# Patient Record
Sex: MALE | Race: AMERICAN INDIAN OR ALASKA NATIVE
[De-identification: names, ages, dates, MRNs, and addresses within clinical notes are randomized per-mention and may not be internally consistent; named-entity substitution may affect disease eponyms.]

---

## 2020-10-18 ENCOUNTER — HOSPITAL ENCOUNTER (EMERGENCY)
Dept: HOSPITAL 5 - ED | Age: 35
Discharge: HOME | End: 2020-10-18
Payer: SELF-PAY

## 2020-10-18 VITALS — SYSTOLIC BLOOD PRESSURE: 168 MMHG | DIASTOLIC BLOOD PRESSURE: 117 MMHG

## 2020-10-18 DIAGNOSIS — I10: ICD-10-CM

## 2020-10-18 DIAGNOSIS — I16.0: Primary | ICD-10-CM

## 2020-10-18 DIAGNOSIS — Z79.899: ICD-10-CM

## 2020-10-18 DIAGNOSIS — F17.200: ICD-10-CM

## 2020-10-18 DIAGNOSIS — E86.0: ICD-10-CM

## 2020-10-18 LAB
BASOPHILS # (AUTO): 0.1 K/MM3 (ref 0–0.1)
BASOPHILS NFR BLD AUTO: 0.7 % (ref 0–1.8)
BUN SERPL-MCNC: 13 MG/DL (ref 9–20)
BUN/CREAT SERPL: 13 %
CALCIUM SERPL-MCNC: 9.5 MG/DL (ref 8.4–10.2)
EOSINOPHIL # BLD AUTO: 0 K/MM3 (ref 0–0.4)
EOSINOPHIL NFR BLD AUTO: 0.3 % (ref 0–4.3)
HCT VFR BLD CALC: 43.9 % (ref 35.5–45.6)
HEMOLYSIS INDEX: 6
HGB BLD-MCNC: 14.9 GM/DL (ref 11.8–15.2)
LYMPHOCYTES # BLD AUTO: 1.7 K/MM3 (ref 1.2–5.4)
LYMPHOCYTES NFR BLD AUTO: 17 % (ref 13.4–35)
MCHC RBC AUTO-ENTMCNC: 34 % (ref 32–34)
MCV RBC AUTO: 91 FL (ref 84–94)
MONOCYTES # (AUTO): 0.7 K/MM3 (ref 0–0.8)
MONOCYTES % (AUTO): 6.8 % (ref 0–7.3)
PLATELET # BLD: 184 K/MM3 (ref 140–440)
RBC # BLD AUTO: 4.84 M/MM3 (ref 3.65–5.03)

## 2020-10-18 PROCEDURE — 70450 CT HEAD/BRAIN W/O DYE: CPT

## 2020-10-18 PROCEDURE — 99285 EMERGENCY DEPT VISIT HI MDM: CPT

## 2020-10-18 PROCEDURE — 85025 COMPLETE CBC W/AUTO DIFF WBC: CPT

## 2020-10-18 PROCEDURE — 96374 THER/PROPH/DIAG INJ IV PUSH: CPT

## 2020-10-18 PROCEDURE — 84484 ASSAY OF TROPONIN QUANT: CPT

## 2020-10-18 PROCEDURE — 36415 COLL VENOUS BLD VENIPUNCTURE: CPT

## 2020-10-18 PROCEDURE — 96361 HYDRATE IV INFUSION ADD-ON: CPT

## 2020-10-18 PROCEDURE — 80048 BASIC METABOLIC PNL TOTAL CA: CPT

## 2020-10-18 PROCEDURE — 93005 ELECTROCARDIOGRAM TRACING: CPT

## 2020-10-18 PROCEDURE — 71046 X-RAY EXAM CHEST 2 VIEWS: CPT

## 2020-10-18 NOTE — EMERGENCY DEPARTMENT REPORT
ED Dizziness HPI





- General


Chief Complaint: High BP


Stated Complaint: HBP


Time Seen by Provider: 10/18/20 16:57


Source: patient


Mode of arrival: Ambulatory


Limitations: No Limitations





- History of Present Illness


Initial Comments: 





Chief complaint:"I was dizzy.  I am worried about my blood pressure."





HPI: This is a 35-year-old male with history of hypertension tobacco dependence 

and medication noncompliance who presents with lightheadedness vomiting for the 

past 2 days.  For 2 days he has noticed lightheadedness and generalized 

weakness.  Mild malaise and fatigue.  No known sick contacts.  He denies fever. 

On yesterday he developed vomiting.  No known history of diabetes.  He denies 

headache.  Denies blurred vision.  Denies chest pain.  Denies abdominal pain.


MD Complaint: lightheadedness


-: Gradual, days(s) (2)


Timing: gradual onset


Description: lightheadedness


History of Same: No


History of Trauma: No


Severity: moderate


Improves With: rest


Worsens With: movement, exertion


Associated Symptoms: malaise, other (Vomiting)





- Related Data


                                  Previous Rx's











 Medication  Instructions  Recorded  Last Taken  Type


 


HYDROcodone/APAP 5-325 [[place] 1 each PO Q6HR PRN #16 tablet 12/15/16 Unknown Rx





5/325]    


 


amLODIPine 5 mg PO DAILY #30 tab 10/18/20 Unknown Rx


 


hydroCHLOROthiazide [HCTZ] 25 mg PO QDAY #30 tablet 10/18/20 Unknown Rx











                                    Allergies











Allergy/AdvReac Type Severity Reaction Status Date / Time


 


No Known Allergies Allergy   Verified 10/18/20 13:59














ED Review of Systems


ROS: 


Stated complaint: HBP


Other details as noted in HPI





Comment: All other systems reviewed and negative


Constitutional: malaise.  denies: fever


Respiratory: denies: cough, shortness of breath


Cardiovascular: denies: chest pain


Gastrointestinal: nausea, vomiting.  denies: abdominal pain, diarrhea





ED Past Medical Hx





- Past Medical History


Previous Medical History?: Yes


Hx Hypertension: Yes (noncompliant)





- Surgical History


Past Surgical History?: No





- Social History


Smoking Status: Current Every Day Smoker


Substance Use Type: None





- Medications


Home Medications: 


                                Home Medications











 Medication  Instructions  Recorded  Confirmed  Last Taken  Type


 


HYDROcodone/APAP 5-325 [[place] 1 each PO Q6HR PRN #16 tablet 12/15/16  Unknown Rx





5/325]     


 


amLODIPine 5 mg PO DAILY #30 tab 10/18/20  Unknown Rx


 


hydroCHLOROthiazide [HCTZ] 25 mg PO QDAY #30 tablet 10/18/20  Unknown Rx














ED Physical Exam





- General


Limitations: No Limitations


General appearance: alert, in no apparent distress, other (Appears mildly ill, 

appears uncomfortable)





- Head


Head exam: Present: atraumatic, normocephalic





- Eye


Eye exam: Present: normal appearance





- ENT


ENT exam: Present: mucous membranes moist





- Neck


Neck exam: Present: normal inspection, full ROM





- Respiratory


Respiratory exam: Present: normal lung sounds bilaterally.  Absent: respiratory 

distress, wheezes, rales, rhonchi





- Cardiovascular


Cardiovascular Exam: Present: regular rate, normal rhythm, normal heart sounds. 

 Absent: systolic murmur, diastolic murmur, rubs, gallop





- GI/Abdominal


GI/Abdominal exam: Present: soft, normal bowel sounds.  Absent: distended, 

tenderness, guarding, rebound





- Rectal


Rectal exam: Present: deferred





- Extremities Exam


Extremities exam: Present: normal inspection





- Neurological Exam


Neurological exam: Present: alert, oriented X3





- Psychiatric


Psychiatric exam: Present: normal affect, normal mood





- Skin


Skin exam: Present: warm, dry, intact, normal color.  Absent: rash





ED Course


                                   Vital Signs











  10/18/20 10/18/20 10/18/20





  14:05 17:30 18:00


 


Temperature 98.3 F  


 


Pulse Rate 78 67 96 H


 


Respiratory 20 24 30 H





Rate   


 


Blood Pressure  189/125 185/132


 


Blood Pressure 203/149  





[Right]   


 


O2 Sat by Pulse 100 98 92





Oximetry   














  10/18/20 10/18/20





  18:31 19:00


 


Temperature  


 


Pulse Rate 70 77


 


Respiratory 23 33 H





Rate  


 


Blood Pressure 165/115 160/121


 


Blood Pressure  





[Right]  


 


O2 Sat by Pulse 93 99





Oximetry  














ED Medical Decision Making





- Lab Data


Result diagrams: 


                                 10/18/20 14:59





                                 10/18/20 14:59








                         Laboratory Results - last 24 hr











  10/18/20 10/18/20





  14:59 14:59


 


WBC  9.8 


 


RBC  4.84 


 


Hgb  14.9 


 


Hct  43.9 


 


MCV  91 


 


MCH  31 


 


MCHC  34 


 


RDW  14.5 


 


Plt Count  184 


 


Lymph % (Auto)  17.0 


 


Mono % (Auto)  6.8 


 


Eos % (Auto)  0.3 


 


Baso % (Auto)  0.7 


 


Lymph # (Auto)  1.7 


 


Mono # (Auto)  0.7 


 


Eos # (Auto)  0.0 


 


Baso # (Auto)  0.1 


 


Seg Neutrophils %  75.2 H 


 


Seg Neutrophils #  7.3 


 


Sodium   139


 


Potassium   4.1


 


Chloride   101.1


 


Carbon Dioxide   25


 


Anion Gap   17


 


BUN   13


 


Creatinine   1.0


 


Estimated GFR   > 60


 


BUN/Creatinine Ratio   13


 


Glucose   97


 


Calcium   9.5


 


Troponin T   < 0.010














- EKG Data


-: EKG Interpreted by Me


EKG shows normal: sinus rhythm, axis


Rate: normal





- EKG Data


Interpretation: nonspecific ST-T wave daja, LVH





10/18/20 17:21


EKG obtained 1412


Normal sinus rhythm rate 80 bpm normal axis prolonged QTC positive LVH. + 

Repolarization normality





- Radiology Data


Radiology results: report reviewed, image reviewed





CHEST 1 VIEW 





INDICATION / CLINICAL INFORMATION: 


Chest Pain. 





COMPARISON: 


None available. 





FINDINGS: 





SUPPORT DEVICES: None. 


HEART / MEDIASTINUM: No significant abnormality. 


LUNGS / PLEURA: No significant pulmonary or pleural abnormality. No 

pneumothorax. 





ADDITIONAL FINDINGS: No significant additional findings. 





IMPRESSION: 


No significant abnormality 











- Medical Decision Making





Mr. Huff presents with fatigue lightheadedness vomiting as well as severe 

hypertension.





1.  Lightheadedness fatigue malaise vomiting attributed to early viral syndrome 

dehydration.  No indication of CVA or hyperglycemia.  Patient felt much better 

after receiving IV fluid therapy and IV antiemetic





2.  Hypertensive urgency due to noncompliance: Patient has severe hypertension. 

 No evidence of endorgan damage.  Patient given extensive verbal and written 

education.  I prescribed amlodipine hydrochlorothiazide.  Blood pressure 

improved with p.o. labetalol provided in the emergency department.





3.  CT head ordered per triage provider.  CT head revealed chronic white matter 

changes.  No intracranial hemorrhage.  Also right maxillary sinus opacification 

which appears to be an incidental finding.  Patient is symptom patient does not 

have symptoms of acute sinusitis.











                               Vital Signs - 24 hr











  10/18/20 10/18/20 10/18/20





  14:05 17:30 18:00


 


Temperature 98.3 F  


 


Pulse Rate 78 67 96 H


 


Respiratory 20 24 30 H





Rate   


 


Blood Pressure  189/125 185/132


 


Blood Pressure 203/149  





[Right]   


 


O2 Sat by Pulse 100 98 92





Oximetry   














  10/18/20 10/18/20





  18:31 19:00


 


Temperature  


 


Pulse Rate 70 77


 


Respiratory 23 33 H





Rate  


 


Blood Pressure 165/115 160/121


 


Blood Pressure  





[Right]  


 


O2 Sat by Pulse 93 99





Oximetry  











Critical care attestation.: 


If time is entered above; I have spent that time in minutes in the direct care 

of this critically ill patient, excluding procedure time.








ED Disposition


Clinical Impression: 


 Hypertensive urgency, Dehydration





Disposition: DC-01 TO HOME OR SELFCARE


Is pt being admited?: No


Does the pt Need Aspirin: No


Condition: Stable


Instructions:  Hypertension (ED)


Prescriptions: 


amLODIPine 5 mg PO DAILY #30 tab


hydroCHLOROthiazide [HCTZ] 25 mg PO QDAY #30 tablet


Referrals: 


WINSOME CARRILLO MD [Staff Physician] - 3-5 Days

## 2020-10-18 NOTE — CAT SCAN REPORT
CT BRAIN: 10/18/2020



INDICATION / CLINICAL INFORMATION:

Persistent dizziness.



COMPARISON: 

None available.



FINDINGS:



BRAIN/INTRACRANIAL STRUCTURES: Unenhanced CT images of the brain demonstrate no evidence of acute int
racranial abnormality.



There are focal areas of chronic appearing hypoattenuation located in the region of the left thalamus
 and right centrum semiovale, as well as in the right central bessie. These findings may be associated 
with chronic small vessel ischemic change or demyelination.



There is no evidence of hemorrhage. There are no abnormal extra-axial fluid collections.









EXTRACRANIAL STRUCTURES: Incidental note is made of opacification of the right maxillary sinus. Paran
tori sinuses are otherwise clear.







IMPRESSION:

 No definite evidence of acute abnormality. Chronic appearing white matter and thalamic hypodensities
.









All CT scans at this location are performed using dose reduction to ALARA by means of automated expos
ure control.



Signer Name: Cm Stephen MD 

Signed: 10/18/2020 3:02 PM

Workstation Name: [id number]

## 2020-10-18 NOTE — XRAY REPORT
CHEST 1 VIEW 



INDICATION / CLINICAL INFORMATION:

Chest Pain.



COMPARISON: 

None available.



FINDINGS:



SUPPORT DEVICES: None.

HEART / MEDIASTINUM: No significant abnormality. 

LUNGS / PLEURA: No significant pulmonary or pleural abnormality. No pneumothorax. 



ADDITIONAL FINDINGS: No significant additional findings.



IMPRESSION:

No significant abnormality



Signer Name: Ganesh Aragon MD FACR 

Signed: 10/18/2020 2:52 PM

Workstation Name: [id number]